# Patient Record
Sex: FEMALE | Race: WHITE
[De-identification: names, ages, dates, MRNs, and addresses within clinical notes are randomized per-mention and may not be internally consistent; named-entity substitution may affect disease eponyms.]

---

## 2019-07-29 ENCOUNTER — HOSPITAL ENCOUNTER (INPATIENT)
Dept: HOSPITAL 10 - OBT | Age: 24
LOS: 2 days | Discharge: HOME | End: 2019-07-31
Attending: OBSTETRICS & GYNECOLOGY | Admitting: OBSTETRICS & GYNECOLOGY
Payer: MEDICAID

## 2019-07-29 ENCOUNTER — HOSPITAL ENCOUNTER (INPATIENT)
Dept: HOSPITAL 91 - OBT | Age: 24
LOS: 2 days | Discharge: HOME | End: 2019-07-31
Payer: MEDICAID

## 2019-07-29 VITALS — RESPIRATION RATE: 18 BRPM | HEART RATE: 88 BPM | DIASTOLIC BLOOD PRESSURE: 84 MMHG | SYSTOLIC BLOOD PRESSURE: 121 MMHG

## 2019-07-29 VITALS — HEART RATE: 108 BPM | RESPIRATION RATE: 18 BRPM | SYSTOLIC BLOOD PRESSURE: 123 MMHG | DIASTOLIC BLOOD PRESSURE: 83 MMHG

## 2019-07-29 VITALS
BODY MASS INDEX: 33.98 KG/M2 | HEIGHT: 60 IN | BODY MASS INDEX: 33.98 KG/M2 | WEIGHT: 173.06 LBS | HEIGHT: 60 IN | WEIGHT: 173.06 LBS

## 2019-07-29 DIAGNOSIS — Z3A.37: ICD-10-CM

## 2019-07-29 LAB
ADD MAN DIFF?: NO
BASOPHIL #: 0.1 10^3/UL (ref 0–0.1)
BASOPHILS %: 0.5 % (ref 0–2)
EOSINOPHILS #: 0.1 10^3/UL (ref 0–0.5)
EOSINOPHILS %: 0.5 % (ref 0–7)
HEMATOCRIT: 41 % (ref 37–47)
HEMOGLOBIN: 13.5 G/DL (ref 12–16)
IMMATURE GRANS #M: 0.11 10^3/UL (ref 0–0.03)
IMMATURE GRANS % (M): 1 % (ref 0–0.43)
INR: 0.92
LYMPHOCYTES #: 2.9 10^3/UL (ref 0.8–2.9)
LYMPHOCYTES %: 25.8 % (ref 15–51)
MEAN CORPUSCULAR HEMOGLOBIN: 28.7 PG (ref 29–33)
MEAN CORPUSCULAR HGB CONC: 32.9 G/DL (ref 32–37)
MEAN CORPUSCULAR VOLUME: 87.2 FL (ref 82–101)
MEAN PLATELET VOLUME: 10 FL (ref 7.4–10.4)
MONOCYTE #: 0.6 10^3/UL (ref 0.3–0.9)
MONOCYTES %: 5.6 % (ref 0–11)
NEUTROPHIL #: 7.4 10^3/UL (ref 1.6–7.5)
NEUTROPHILS %: 66.6 % (ref 39–77)
NUCLEATED RED BLOOD CELLS #: 0 10^3/UL (ref 0–0)
NUCLEATED RED BLOOD CELLS%: 0 /100WBC (ref 0–0)
PARTIAL THROMBOPLASTIN TIME: 31.1 SEC (ref 23–35)
PLATELET COUNT: 354 10^3/UL (ref 140–415)
PROTIME: 12.5 SEC (ref 11.9–14.9)
PT RATIO: 1
RAPID PLASMA REAGIN: NONREACTIVE
RED BLOOD COUNT: 4.7 10^6/UL (ref 4.2–5.4)
RED CELL DISTRIBUTION WIDTH: 13.7 % (ref 11.5–14.5)
WHITE BLOOD COUNT: 11.1 10^3/UL (ref 4.8–10.8)

## 2019-07-29 PROCEDURE — 85730 THROMBOPLASTIN TIME PARTIAL: CPT

## 2019-07-29 PROCEDURE — 0HQ9XZZ REPAIR PERINEUM SKIN, EXTERNAL APPROACH: ICD-10-PCS | Performed by: OBSTETRICS & GYNECOLOGY

## 2019-07-29 PROCEDURE — G0463 HOSPITAL OUTPT CLINIC VISIT: HCPCS

## 2019-07-29 PROCEDURE — 85610 PROTHROMBIN TIME: CPT

## 2019-07-29 PROCEDURE — 86850 RBC ANTIBODY SCREEN: CPT

## 2019-07-29 PROCEDURE — 90716 VAR VACCINE LIVE SUBQ: CPT

## 2019-07-29 PROCEDURE — 85025 COMPLETE CBC W/AUTO DIFF WBC: CPT

## 2019-07-29 PROCEDURE — 0HQ9XZZ REPAIR PERINEUM SKIN, EXTERNAL APPROACH: ICD-10-PCS

## 2019-07-29 PROCEDURE — 86901 BLOOD TYPING SEROLOGIC RH(D): CPT

## 2019-07-29 PROCEDURE — 86592 SYPHILIS TEST NON-TREP QUAL: CPT

## 2019-07-29 PROCEDURE — 86900 BLOOD TYPING SEROLOGIC ABO: CPT

## 2019-07-29 RX ADMIN — AMPICILLIN 1 MLS/HR: 2 INJECTION, POWDER, FOR SOLUTION INTRAVENOUS at 11:54

## 2019-07-29 RX ADMIN — AMPICILLIN SODIUM SCH MLS/HR: 1 INJECTION, POWDER, FOR SOLUTION INTRAMUSCULAR; INTRAVENOUS at 19:00

## 2019-07-29 RX ADMIN — AMPICILLIN 1 MLS/HR: 1 INJECTION, POWDER, FOR SOLUTION INTRAMUSCULAR; INTRAVENOUS at 15:59

## 2019-07-29 RX ADMIN — PYRIDOXINE HYDROCHLORIDE SCH MLS/HR: 100 INJECTION, SOLUTION INTRAMUSCULAR; INTRAVENOUS at 11:34

## 2019-07-29 RX ADMIN — LIDOCAINE HYDROCHLORIDE 1 ML: 10 INJECTION, SOLUTION EPIDURAL; INFILTRATION; INTRACAUDAL; PERINEURAL at 20:24

## 2019-07-29 RX ADMIN — AMPICILLIN SODIUM SCH MLS/HR: 1 INJECTION, POWDER, FOR SOLUTION INTRAMUSCULAR; INTRAVENOUS at 15:59

## 2019-07-29 RX ADMIN — Medication 1 MLS/HR: at 20:15

## 2019-07-29 RX ADMIN — BUTORPHANOL TARTRATE 1 MG: 2 INJECTION, SOLUTION INTRAMUSCULAR; INTRAVENOUS at 17:03

## 2019-07-29 RX ADMIN — AMPICILLIN 1 MLS/HR: 1 INJECTION, POWDER, FOR SOLUTION INTRAMUSCULAR; INTRAVENOUS at 19:00

## 2019-07-29 RX ADMIN — PYRIDOXINE HYDROCHLORIDE 1 MLS/HR: 100 INJECTION, SOLUTION INTRAMUSCULAR; INTRAVENOUS at 19:00

## 2019-07-29 RX ADMIN — PYRIDOXINE HYDROCHLORIDE SCH MLS/HR: 100 INJECTION, SOLUTION INTRAMUSCULAR; INTRAVENOUS at 19:00

## 2019-07-29 RX ADMIN — IBUPROFEN 1 MG: 600 TABLET ORAL at 20:31

## 2019-07-29 RX ADMIN — Medication 1 MLS/HR: at 18:08

## 2019-07-29 RX ADMIN — Medication 1 MLS/HR: at 20:23

## 2019-07-29 RX ADMIN — PYRIDOXINE HYDROCHLORIDE 1 MLS/HR: 100 INJECTION, SOLUTION INTRAMUSCULAR; INTRAVENOUS at 11:34

## 2019-07-29 NOTE — TRIAGE
===================================

OB Triage

===================================

Datetime Report Generated by CPN: 07/29/2019 10:30

   

   

===========================

Datetime: 07/29/2019 10:20

===========================

   

   

===================================

Vaginal Exam

===================================

   

 Dilatation (cms):  3.5

 Effacement (%):  70

 Station:  -2

 Exam By:  kaila

 Vaginal Bleeding:  None

 Cervix, Consistency:  Soft

 Cervix, Position:  Posterior

   

===========================

Datetime: 07/29/2019 09:58

===========================

   

 Assessment Type:  Triage

   

===================================

Maternal Assessment

===================================

   

 Level of Consciousness:  Keenly Alert, Responsive

 DTR's/Clonus:  DTRs 2+; No Clonus

 Headache:  Denies

 Blurred Vision:  No

 Respiratory Effort:  Unlabored; Regular Rhythm; Equal Expansion

 Breath Sounds, Left:  Clear and Equal

 Breath Sounds, Right:  Clear and Equal

 Nausea/Vomiting:  Denies

 RUQ Epigastric Pain:  Denies

 Lower Extremities Edema:  None

     Degree:  None

 Upper Extremities Edema:  None

     Degree:  None

 Facial Edema:  None

   

===================================

Fall Risk Assessment

===================================

   

 History of Falling:  (0) No

 Secondary Diagnosis:  (0) No

 Ambulatory Aid:  (0) Bedrest/Nurse Assist

 IV Therapy:  (0) No

 Gait:  (0) Normal/Bedrest/Immobile

 Mental Status:  (0) Oriented to Own Ability

 Fall Score:  0

 Fall Risk Score Definition:  No Risk: No action required

   

===========================

Datetime: 07/29/2019 09:57

===========================

   

 Time of Arrival:  07/29/2019 09:42

 EGA:  37.4

 Arrived By:  Ambulatory

 Arrived From:  Home

 Chief Complaint:  pt. here C/O UC'S

 Fetal Movement:  Present

 Contractions:  Irregular

 Rupture of Membranes:  Denies

 Vaginal Bleeding:  None

 Vaginal Discharge:  Denies

 Recent Sexual Intercouse:  Denies

 Abdominal Trauma:  Not Applicable

 Patient Complaints:  Contractions; Cramping; Back Pain

 Time Provider Notified:  07/29/2019 10:25

 Provider Notified:  MARLA

 Initial Plan:  SVE/EFM

   

===========================

Datetime: 07/29/2019 09:54

===========================

   

   

===================================

Labor Evaluation

===================================

   

 Monitor Mode:  External

   

===================================

Fetal Heart Rate

===================================

   

 Monitor Mode:  External US

## 2019-07-29 NOTE — LDN
Date/Time of Note


Date/Time of Note


DATE: 7/29/19 


TIME: 20:07





Delivery Summary


Placenta Delivered:  Spontaneously


Meconium:  none


Episiotomy:  No


Perineal laceration:  1


Anesthesia type:  Local


Estimated blood loss:  300


Sponge & Needle done & correct:  Yes


All needle counts correct:  Yes


Any foreign bodies felt in the:  No











HARRIS GUTIÉRREZ MD             Jul 29, 2019 20:08

## 2019-07-29 NOTE — HP
Date/Time of Note


Date/Time of Note


DATE: 19 


TIME: 18:37





OB - History


Hx of Present Pregnancy


:  1


Para:  0


Prenatal Care:  Good Care


Ultrasounds:  Normal mid trimester US


Obstetrical Complications:  None


Medical Complications:  None





Past Family/Social History


*


Past Medical, Surgical, Family and Obstetric Histories reviewed from prenatal 


chart.





OB  Admission Exam


Vital Signs


Vital Signs





Vital Signs


  Date      Temp  Pulse  Resp  B/P (MAP)   Pulse Ox  O2          O2 Flow    FiO2


Time                                                 Delivery    Rate


   19  98.8    108    18      123/83            Room Air


     10:00                           (96)








Physical Exam


HEENT:  WNL


Heart:  Rhythm Normal


Lungs:  Clear, Equal


Abdomen:  WNL


Extremities:  Normal


Reflexes:  Normal


Cervical Dilatation:  1cm


Effacement:  25%


Station:  -3


Membranes:  Intact


Fetal Heart Rate:  130's


Accelerations:  Accelerations Present


Decelerations:  No Decelerations


Varibility:  Moderate


Contractions on Admission:  6-10 Minutes Apart


Intensity:  Moderate


Last 72 hours Lab Results


                                    CBC & BMP


19 11:30











OB  Assessment/Plan


Reason for admission:  induction of labor (fluid is 7cm)


Plan:  Induction











HARRIS GUTIÉRREZ MD             2019 18:38

## 2019-07-30 VITALS — HEART RATE: 90 BPM | DIASTOLIC BLOOD PRESSURE: 73 MMHG | SYSTOLIC BLOOD PRESSURE: 122 MMHG | RESPIRATION RATE: 18 BRPM

## 2019-07-30 VITALS — DIASTOLIC BLOOD PRESSURE: 63 MMHG | RESPIRATION RATE: 18 BRPM | HEART RATE: 85 BPM | SYSTOLIC BLOOD PRESSURE: 109 MMHG

## 2019-07-30 VITALS — RESPIRATION RATE: 18 BRPM | DIASTOLIC BLOOD PRESSURE: 67 MMHG | HEART RATE: 86 BPM | SYSTOLIC BLOOD PRESSURE: 111 MMHG

## 2019-07-30 VITALS — SYSTOLIC BLOOD PRESSURE: 112 MMHG | RESPIRATION RATE: 18 BRPM | HEART RATE: 62 BPM | DIASTOLIC BLOOD PRESSURE: 67 MMHG

## 2019-07-30 VITALS — DIASTOLIC BLOOD PRESSURE: 78 MMHG | SYSTOLIC BLOOD PRESSURE: 122 MMHG | HEART RATE: 90 BPM | RESPIRATION RATE: 18 BRPM

## 2019-07-30 LAB
ADD MAN DIFF?: NO
BASOPHIL #: 0.1 10^3/UL (ref 0–0.1)
BASOPHILS %: 0.3 % (ref 0–2)
EOSINOPHILS #: 0 10^3/UL (ref 0–0.5)
EOSINOPHILS %: 0.3 % (ref 0–7)
HEMATOCRIT: 36.4 % (ref 37–47)
HEMOGLOBIN: 12.2 G/DL (ref 12–16)
IMMATURE GRANS #M: 0.11 10^3/UL (ref 0–0.03)
IMMATURE GRANS % (M): 0.8 % (ref 0–0.43)
LYMPHOCYTES #: 2.6 10^3/UL (ref 0.8–2.9)
LYMPHOCYTES %: 17.8 % (ref 15–51)
MEAN CORPUSCULAR HEMOGLOBIN: 29 PG (ref 29–33)
MEAN CORPUSCULAR HGB CONC: 33.5 G/DL (ref 32–37)
MEAN CORPUSCULAR VOLUME: 86.5 FL (ref 82–101)
MEAN PLATELET VOLUME: 9.9 FL (ref 7.4–10.4)
MONOCYTE #: 0.9 10^3/UL (ref 0.3–0.9)
MONOCYTES %: 6.2 % (ref 0–11)
NEUTROPHIL #: 10.9 10^3/UL (ref 1.6–7.5)
NEUTROPHILS %: 74.6 % (ref 39–77)
NUCLEATED RED BLOOD CELLS #: 0 10^3/UL (ref 0–0)
NUCLEATED RED BLOOD CELLS%: 0 /100WBC (ref 0–0)
PLATELET COUNT: 297 10^3/UL (ref 140–415)
RED BLOOD COUNT: 4.21 10^6/UL (ref 4.2–5.4)
RED CELL DISTRIBUTION WIDTH: 13.8 % (ref 11.5–14.5)
WHITE BLOOD COUNT: 14.6 10^3/UL (ref 4.8–10.8)

## 2019-07-30 RX ADMIN — Medication 1 APPLIC: at 00:04

## 2019-07-30 RX ADMIN — Medication 1 SPRAY: at 00:04

## 2019-07-30 RX ADMIN — PYRIDOXINE HYDROCHLORIDE 1 MLS/HR: 100 INJECTION, SOLUTION INTRAMUSCULAR; INTRAVENOUS at 05:54

## 2019-07-30 RX ADMIN — IBUPROFEN 1 MG: 800 TABLET, FILM COATED ORAL at 17:22

## 2019-07-30 RX ADMIN — IBUPROFEN 1 MG: 800 TABLET, FILM COATED ORAL at 00:04

## 2019-07-30 RX ADMIN — PYRIDOXINE HYDROCHLORIDE 1 MLS/HR: 100 INJECTION, SOLUTION INTRAMUSCULAR; INTRAVENOUS at 13:54

## 2019-07-30 RX ADMIN — IBUPROFEN SCH MG: 800 TABLET ORAL at 00:04

## 2019-07-30 RX ADMIN — IBUPROFEN SCH MG: 800 TABLET ORAL at 12:51

## 2019-07-30 RX ADMIN — PYRIDOXINE HYDROCHLORIDE 1 MLS/HR: 100 INJECTION, SOLUTION INTRAMUSCULAR; INTRAVENOUS at 21:54

## 2019-07-30 RX ADMIN — IBUPROFEN SCH MG: 800 TABLET ORAL at 05:53

## 2019-07-30 RX ADMIN — PYRIDOXINE HYDROCHLORIDE SCH MLS/HR: 100 INJECTION, SOLUTION INTRAMUSCULAR; INTRAVENOUS at 13:54

## 2019-07-30 RX ADMIN — Medication PRN APPLIC: at 00:04

## 2019-07-30 RX ADMIN — PYRIDOXINE HYDROCHLORIDE SCH MLS/HR: 100 INJECTION, SOLUTION INTRAMUSCULAR; INTRAVENOUS at 05:54

## 2019-07-30 RX ADMIN — IBUPROFEN 1 MG: 800 TABLET, FILM COATED ORAL at 12:51

## 2019-07-30 RX ADMIN — Medication 1 MLS/HR: at 01:16

## 2019-07-30 RX ADMIN — WITCH HAZEL 1 PAD: 500 SOLUTION RECTAL; TOPICAL at 00:04

## 2019-07-30 RX ADMIN — PYRIDOXINE HYDROCHLORIDE SCH MLS/HR: 100 INJECTION, SOLUTION INTRAMUSCULAR; INTRAVENOUS at 21:54

## 2019-07-30 RX ADMIN — IBUPROFEN SCH MG: 800 TABLET ORAL at 17:22

## 2019-07-30 RX ADMIN — HYDROCODONE BITARTRATE AND ACETAMINOPHEN 1 TAB: 5; 325 TABLET ORAL at 14:43

## 2019-07-30 RX ADMIN — IBUPROFEN 1 MG: 800 TABLET, FILM COATED ORAL at 05:53

## 2019-07-30 NOTE — DS
Date/Time of Note


Date/Time of Note


DATE: 7/30/19 


TIME: 07:48





Discharge Summary


Admission/Discharge Info


Admit Date/Time


Jul 29, 2019 at 11:01


Discharge Date/Time





Discharge Diagnosis


term pregnancy


Patient Condition:  Stable


Hospital Course


unremarkable


Home Meds


Reported Medications


Prenatal Vit No.124/Iron/FA (Prenatal Vitamin Tablet) 1 Each Tablet, 1 EACH PO 


DAILY, TAB


   7/29/19


Primary Care Provider


Care Physician No Primary


Pending Labs





Laboratory Tests


Test
                                       7/29/19
11:30          7/30/19
07:01


White Blood Count
                11.1 10^3/ul
(4.8-10.8)  



Red Blood Count
                 4.70 10^6/ul
(4.20-5.40)  



Hemoglobin
                         13.5 g/dl
(12.0-16.0)  



Hematocrit
                            41.0 %
(37.0-47.0)  



Mean Corpuscular Volume
             87.2 fl
(82.0-101.0)  



Mean Corpuscular Hemoglobin
          28.7 pg
(29.0-33.0)  



Mean Corpuscular                    32.9 g/dl
(32.0-37.0)  



Hemoglobin
Concent


Red Cell Distribution Width
           13.7 %
(11.5-14.5)  



Platelet Count
                     354 10^3/UL
(140-415)  



Mean Platelet Volume
                  10.0 fl
(7.4-10.4)  



Immature Granulocytes %
            1.000 %
(0.001-0.429)  



Neutrophils %
                         66.6 %
(39.0-77.0)  



Lymphocytes %
                         25.8 %
(15.0-51.0)  



Monocytes %
                             5.6 %
(0.0-11.0)  



Eosinophils %
                            0.5 %
(0.0-7.0)  



Basophils %
                              0.5 %
(0.0-2.0)  



Nucleated Red Blood Cells %
        0.0 /100WBC
(0.0-0.0)  



Immature Granulocytes #
        0.110 10^3/ul
(0.0-0.031)  



Neutrophils #
                      7.4 10^3/ul
(1.6-7.5)  



Lymphocytes #
                      2.9 10^3/ul
(0.8-2.9)  



Monocytes #
                        0.6 10^3/ul
(0.3-0.9)  



Eosinophils #
                      0.1 10^3/ul
(0.0-0.5)  



Basophils #
                        0.1 10^3/ul
(0.0-0.1)  



Nucleated Red Blood Cells #
        0.0 10^3/ul
(0.0-0.0)  



Prothrombin Time
                    12.5 Sec
(11.9-14.9)  



Prothrombin Time Ratio                               1.0


INR International                                  0.92 
  



Normalized
Ratio


Activated Partial
Thromboplast       31.1 Sec
(23.0-35.0)  



Time


Rapid Plasma Reagin
            NONREACTIVE
(NR)           



Lab Scanned Report
             
                          REFERENCE LAB
[id number]














HARRIS GUTIÉRREZ MD             Jul 30, 2019 07:49

## 2019-07-31 VITALS — RESPIRATION RATE: 19 BRPM | DIASTOLIC BLOOD PRESSURE: 75 MMHG | HEART RATE: 76 BPM | SYSTOLIC BLOOD PRESSURE: 114 MMHG

## 2019-07-31 VITALS — HEART RATE: 69 BPM | DIASTOLIC BLOOD PRESSURE: 58 MMHG | RESPIRATION RATE: 18 BRPM | SYSTOLIC BLOOD PRESSURE: 103 MMHG

## 2019-07-31 RX ADMIN — IBUPROFEN 1 MG: 800 TABLET, FILM COATED ORAL at 00:20

## 2019-07-31 RX ADMIN — IBUPROFEN 1 MG: 800 TABLET, FILM COATED ORAL at 06:08

## 2019-07-31 RX ADMIN — CLOSTRIDIUM TETANI TOXOID ANTIGEN (FORMALDEHYDE INACTIVATED), CORYNEBACTERIUM DIPHTHERIAE TOXOID ANTIGEN (FORMALDEHYDE INACTIVATED), BORDETELLA PERTUSSIS TOXOID ANTIGEN (GLUTARALDEHYDE INACTIVATED), BORDETELLA PERTUSSIS FILAMENTOUS HEMAGGLUTININ ANTIGEN (FORMALDEHYDE INACTIVATED), BORDETELLA PERTUSSIS PERTACTIN ANTIGEN, AND BORDETELLA PERTUSSIS FIMBRIAE 2/3 ANTIGEN 1 ML: 5; 2; 2.5; 5; 3; 5 INJECTION, SUSPENSION INTRAMUSCULAR at 09:00

## 2019-07-31 RX ADMIN — PYRIDOXINE HYDROCHLORIDE SCH MLS/HR: 100 INJECTION, SOLUTION INTRAMUSCULAR; INTRAVENOUS at 05:54

## 2019-07-31 RX ADMIN — PYRIDOXINE HYDROCHLORIDE 1 MLS/HR: 100 INJECTION, SOLUTION INTRAMUSCULAR; INTRAVENOUS at 05:54

## 2019-07-31 RX ADMIN — IBUPROFEN SCH MG: 800 TABLET ORAL at 12:13

## 2019-07-31 RX ADMIN — VARICELLA VIRUS VACCINE LIVE 1 UNIT: 1350 INJECTION, POWDER, LYOPHILIZED, FOR SUSPENSION SUBCUTANEOUS at 09:00

## 2019-07-31 RX ADMIN — MEASLES, MUMPS, AND RUBELLA VIRUS VACCINE LIVE 1 ML: 1000; 12500; 1000 INJECTION, POWDER, LYOPHILIZED, FOR SUSPENSION SUBCUTANEOUS at 09:00

## 2019-07-31 RX ADMIN — IBUPROFEN SCH MG: 800 TABLET ORAL at 06:08

## 2019-07-31 RX ADMIN — IBUPROFEN SCH MG: 800 TABLET ORAL at 00:20

## 2019-07-31 RX ADMIN — Medication 1 APPLIC: at 13:41

## 2019-07-31 RX ADMIN — Medication PRN APPLIC: at 13:41

## 2019-07-31 RX ADMIN — IBUPROFEN 1 MG: 800 TABLET, FILM COATED ORAL at 12:13

## 2019-08-01 NOTE — DELSUM
===================================

Delivery Summary A-C

===================================

Datetime Report Generated by CPN: 2019 14:42

   

   

===================================

DELIVERY PERSONNEL

===================================

   

Circulator:  Vizcaino, Bertha

   

===================================

MATERNAL INFORMATION

===================================

   

Delivery Anesthesia:  Local

Medications in Delivery:  pitocin 30 units

Delivery QBL (ml):  300

Placenta Cultured:  No

Maternal Complications:  None

   

===================================

LABOR SUMMARY

===================================

   

EDC:  08/15/2019 00:00

No. Babies in Womb:  1

 Attempted:  No

Labor Anesthesia:  None

   

===================================

LABOR INFORMATION

===================================

   

Reason for Induction:  Not Applicable

Onset of Labor:  2019 18:00

Complete Dilatation:  2019 19:35

Oxytocin:  Augmentation

Group B Beta Strep:  Positive

Antibiotics # of Doses:  2

Antibiotics Time of Last Dose:  2019 15:59

Steroids Given:  None

Reason Steroids Not Administered:  Not Applicable

   

===================================

MEMBRANES

===================================

   

Membranes Rupture Method:  Artificial

Rupture of Membranes:  2019 18:25

Length of Rupture (hr):  1.55

Amniotic Fluid Color:  Clear

Amniotic Fluid Amount:  Moderate

Amniotic Fluid Odor:  None

   

===================================

STAGES OF LABOR

===================================

   

Stage 1 hr:  25

Stage 1 min:  35

Stage 2 hr:  0

Stage 2 min:  23

Stage 3 hr:  0

Stage 3 min:  6

Total Time in Labor hr:  26

Total Time in Labor min:  4

   

===================================

VAGINAL DELIVERY

===================================

   

Episiotomy:  None

Laceration Extension:  First Degree

Laceration Type:  Perineal

Laceration Repair:  Yes

Initial Vag Sponge Count:  10

Final Vag Sponge Count:  10

Initial Vag Sharps Count:  1

Final Vag Sharps Count:  2

Sponge Count Correct:  Yes; Vaginal Sweep Performed

Sharps Count Correct:  Yes

   

===================================

BABY A INFORMATION

===================================

   

Infant Delivery Date/Time:  2019 19:58

Method of Delivery:  Vaginal

Born in Route :  No

:  N/A

Forceps:  N/A

Vacuum Extraction:  N/A

Shoulder Dystocia :  N/A

   

===================================

SHOULDER DYSTOCIA BABY A

===================================

   

Infant Delivery Date/Time:  2019 19:58

   

===================================

PRESENTATION/POSITION BABY A

===================================

   

Presentation:  Cephalic

Cephalic Presentation:  Vertex

Breech Presentation:  N/A

   

===================================

PLACENTA INFORMATION BABY A

===================================

   

Placenta Delivery Time :  2019 20:04

Placenta Method of Delivery:  Spontaneous

Placenta Status:  Delivered

   

===================================

APGAR SCORES BABY A

===================================

   

Heart Rate 1 min:  >100 bpm

Resp Effort 1 min:  Good Cry

Reflex Irritability 1 min:  Cough/Sneeze/Pulls Away

Muscle Tone 1 min:  Active Motion

Color 1 min:  Blue/Pale

Resuscitation Effort 1 min:  Tactile Stimulation

APGAR SCORE 1 MIN:  8

Heart Rate 5 min:  >100 bpm

Resp Effort 5 min:  Good Cry

Reflex Irritability 5 min:  Cough/Sneeze/Pulls Away

Muscle Tone 5 min:  Active Motion

Color 5 min:  Body Pink, Extremit Blue

Resuscitation Effort 5 min:  Tactile Stimulation

APGAR SCORE 5 MIN:  9

   

===================================

INFANT INFORMATION BABY A

===================================

   

Gestational Age at Delivery:  37.4

Gestational Status:  Early Term- 37- 38.6 Weeks

Infant Outcome :  Liveborn, with signs of life

Infant Condition :  Stable

Infant Sex:  Female

   

===================================

IDENTIFICATION/MEDS BABY A

===================================

   

ID Band Number:  [id number]

ID Band Location:  Right Leg; Left Arm



Sensor Applied:  Yes

Sensor Number:  [id number]

Sensor Location :  Cord Clamp

Vitamin K Given :  Not Given

Erythromycin Given:  Not Given

   

===================================

WEIGHT/LENGTH BABY A

===================================

   

Infant Birthweight (gm):  2940

Infant Weight (lb):  6

Infant Weight (oz):  8

Infant Length (in):  18.00

Infant Length (cm):  45.72

   

===================================

CORD INFORMATION BABY A

===================================

   

No. Cord Vessels:  3

Nuchal Cord :  Around Neck x1, Loose

Cord Blood Taken:  Yes

Infant Suction:  Mouth; Nose

   

===================================

ASSESSMENT BABY A

===================================

   

Infant Complications:  Extended Fetal Bradycardi; Multiple Late Decels; Multiple Variable Decels

Physical Findings at Delivery:  Within Normal Limits

Infant Respirations:  Appears Normal

Neonatologist/ALS Called :  No

Infant Care By:  NICU RT/T BORGES

Transferred To:  Remains with Mother

## 2019-08-16 ENCOUNTER — HOSPITAL ENCOUNTER (EMERGENCY)
Dept: HOSPITAL 10 - E/R | Age: 24
Discharge: HOME | End: 2019-08-16
Payer: MEDICAID

## 2019-08-16 VITALS
WEIGHT: 137.57 LBS | HEIGHT: 60 IN | HEIGHT: 60 IN | WEIGHT: 137.57 LBS | BODY MASS INDEX: 27.01 KG/M2 | BODY MASS INDEX: 27.01 KG/M2

## 2019-08-16 PROCEDURE — 99283 EMERGENCY DEPT VISIT LOW MDM: CPT

## 2019-08-16 NOTE — ERD
ER Documentation


Chief Complaint


Chief Complaint





LEFT BREAST PAIN/SWELLING/REDNESS X2 DAYS





HPI


24-year-old female, status post normal spontaneous vaginal delivery 17 days ago,


presents the emergency department, complaining of 2 days with left breast 


tenderness and erythema.  The patient is currently breast-feeding.  She denies 


fever or chills.





ROS


All systems reviewed and are negative except as per history of present illness.





Medications


Home Meds


Active Scripts


Acetaminophen* (Tylenol*) 325 Mg Tablet, 2 TAB PO Q6 PRN for PAIN AND OR 


ELEVATED TEMP, #20 TAB


   Prov:KELSIE SMITH MD         8/16/19


Ibuprofen* (Motrin*) 400 Mg Tab, 400 MG PO Q6H PRN for PAIN AND OR ELEVATED TEMP


for 4 Days, #20 TAB


   Prov:KELSIE SMITH MD         8/16/19


Clindamycin Hcl* (Clindamycin Hcl*) 300 Mg Capsule, 300 MG PO TID for 10 Days, 


CAP


   Prov:KELSIE SMITH MD         8/16/19


Reported Medications


Prenatal Vit No.124/Iron/FA (Prenatal Vitamin Tablet) 1 Each Tablet, 1 EACH PO 


DAILY, TAB


   7/29/19





Allergies


Allergies:  


Coded Allergies:  


     No Known Allergy (Unverified , 7/29/19)





PMhx/Soc


Medical and Surgical Hx:  pt denies Medical Hx


History of Surgery:  No


Hx Alcohol Use:  No


Hx Substance Use:  No


Hx Tobacco Use:  No


Smoking Status:  Never smoker





FmHx


Family History:  No diabetes, No coronary disease





Physical Exam


Vitals





Vital Signs


  Date      Temp  Pulse  Resp  B/P (MAP)   Pulse Ox  O2          O2 Flow    FiO2


Time                                                 Delivery    Rate


   8/16/19  97.8     85    19      103/65        98


     16:09                           (78)





Physical Exam


Const:   No acute distress


Head:   Atraumatic 


Eyes:    Normal Conjunctiva


ENT:    Normal External Ears, Nose and Mouth.


Neck:               Full range of motion. No meningismus.


Resp:   Clear to auscultation bilaterally


Cardio:   Regular rate and rhythm, no murmurs


Breast: left breast with diffuse erythema and tenderness, no induration, no 


fluctuance. Contralateral breast normal


Abd:    Soft, non tender, non distended. Normal bowel sounds


Skin:   No petechiae or rashes


Back:   No midline or flank tenderness


Ext:    No cyanosis, or edema


Neur:   Awake and alert


Psych:    Normal Mood and Affect





Procedures/MDM


Vital signs stable.Clinical presentation and physical exam c/w left lactational 


mastitis.


Differential diagnosis considered include hematoma, mastitis, lipoma, cyst, 


fibroglandular changes of the breast.  Low suspicion for abscess or malignancy.


During the ED course the patient remained stable, no new complaints. 


Results and clinical impression discussed with the patient who agrees with 


management. The patient is stable to be treated outpatient and will be 


discharged home with a Rx for Clindamycin, some side effects of prescribed 


medications (headache, rash, nausea, vomiting, diarrhea, bleeding, hypertension,


interactions with other medications) were reviewed.





Follow up with the primary care provider in the next 48h has been recommended.  


If symptoms persist, worsen or new symptoms develop, then patient should return 


to the ED immediately.





Instructions explained and given directly by me to the patient with 


acknowledgment and demonstrated understanding.





Disclaimer: Inadvertent spelling and grammatical errors are likely due to 


EHR/dictation software use and do not reflect on the overall quality of patient 


care. Also, please note that the electronic time recorded on this note does not 


necessarily reflect the actual time of the patient encounter.





Departure


Diagnosis:  


   Primary Impression:  


   Mastitis, left, acute


Condition:  Stable





Additional Instructions:  


Muchas perla por [facility] para almaguer servicio.





Esperamos que en almaguer visita a la lennox de emergencia almaguer problema medico haya sido 


solucionado y que se sienta mucho mejor. 





Para estar seguros que almaguer mejoria sigue en proceso, le pedimos el favor de hacer


leodan asad de seguimiento medico con almaguer doctor primario en los proximos 2-4 moreno.





Lleve con usted estos documentos y las medicinas recetadas.





Si mirna sintomas empeoran, NO SE ESPERE, por favor regrese a lennox de emergencia 


INMEDIATAMENTE.





En yvonne que usted no tenga un mdico de atencin primaria:


Llame al mdico o clnica comunitaria de referencia que aparece abajo raymundo 


las horas de consultorio para hacer leodan asad para que le vean.





CLINICAS:


[facility]  [phone] [address] [facility]  [phone] [address] [facility] [phone] [address] [facility]  [phone] [address] [facility]   [phone] [address] [facility]. [phone] 


[address] KELSIE ESPINOSA MD      Aug 16, 2019 16:31